# Patient Record
Sex: MALE | Race: WHITE | NOT HISPANIC OR LATINO | RURAL
[De-identification: names, ages, dates, MRNs, and addresses within clinical notes are randomized per-mention and may not be internally consistent; named-entity substitution may affect disease eponyms.]

---

## 2023-01-31 ENCOUNTER — LAB OUTSIDE AN ENCOUNTER (OUTPATIENT)
Dept: RURAL CLINIC 2 | Facility: CLINIC | Age: 73
End: 2023-01-31

## 2023-01-31 ENCOUNTER — OFFICE VISIT (OUTPATIENT)
Dept: RURAL CLINIC 2 | Facility: CLINIC | Age: 73
End: 2023-01-31
Payer: COMMERCIAL

## 2023-01-31 VITALS
BODY MASS INDEX: 31.22 KG/M2 | DIASTOLIC BLOOD PRESSURE: 108 MMHG | WEIGHT: 223 LBS | HEIGHT: 71 IN | TEMPERATURE: 97.5 F | HEART RATE: 90 BPM | SYSTOLIC BLOOD PRESSURE: 174 MMHG

## 2023-01-31 DIAGNOSIS — R19.5 POSITIVE COLORECTAL CANCER SCREENING USING COLOGUARD TEST: ICD-10-CM

## 2023-01-31 DIAGNOSIS — R12 HEARTBURN: ICD-10-CM

## 2023-01-31 DIAGNOSIS — R05.3 CHRONIC COUGH: ICD-10-CM

## 2023-01-31 PROBLEM — 68154008: Status: ACTIVE | Noted: 2023-01-31

## 2023-01-31 PROCEDURE — 99203 OFFICE O/P NEW LOW 30 MIN: CPT | Performed by: INTERNAL MEDICINE

## 2023-01-31 RX ORDER — SODIUM, POTASSIUM,MAG SULFATES 17.5-3.13G
177ML SOLUTION, RECONSTITUTED, ORAL ORAL
Qty: 1 | Refills: 0 | OUTPATIENT
Start: 2023-01-31 | End: 2023-02-02

## 2023-02-01 PROBLEM — 708699002: Status: ACTIVE | Noted: 2023-01-31

## 2023-02-01 PROBLEM — 16331000: Status: ACTIVE | Noted: 2023-01-31

## 2023-02-08 ENCOUNTER — TELEPHONE ENCOUNTER (OUTPATIENT)
Dept: URBAN - METROPOLITAN AREA CLINIC 92 | Facility: CLINIC | Age: 73
End: 2023-02-08

## 2023-02-21 ENCOUNTER — TELEPHONE ENCOUNTER (OUTPATIENT)
Dept: RURAL CLINIC 2 | Facility: CLINIC | Age: 73
End: 2023-02-21

## 2023-02-24 ENCOUNTER — CLAIMS CREATED FROM THE CLAIM WINDOW (OUTPATIENT)
Dept: RURAL MEDICAL CENTER 4 | Facility: MEDICAL CENTER | Age: 73
End: 2023-02-24

## 2023-02-24 ENCOUNTER — CLAIMS CREATED FROM THE CLAIM WINDOW (OUTPATIENT)
Dept: RURAL MEDICAL CENTER 4 | Facility: MEDICAL CENTER | Age: 73
End: 2023-02-24
Payer: COMMERCIAL

## 2023-02-24 ENCOUNTER — OFFICE VISIT (OUTPATIENT)
Dept: RURAL MEDICAL CENTER 4 | Facility: MEDICAL CENTER | Age: 73
End: 2023-02-24

## 2023-02-24 DIAGNOSIS — I85.00 ESOPHAGEAL  VARICOSE VEINS: ICD-10-CM

## 2023-02-24 DIAGNOSIS — R19.5 ABNORMAL CONSISTENCY OF STOOL: ICD-10-CM

## 2023-02-24 DIAGNOSIS — D12.8 ADENOMATOUS POLYP OF RECTUM: ICD-10-CM

## 2023-02-24 DIAGNOSIS — D12.2 ADENOMA OF ASCENDING COLON: ICD-10-CM

## 2023-02-24 DIAGNOSIS — K22.89 DILATATION OF ESOPHAGUS: ICD-10-CM

## 2023-02-24 DIAGNOSIS — K22.10 BARRETT'S ULCER OF ESOPHAGUS: ICD-10-CM

## 2023-02-24 DIAGNOSIS — Z12.11 COLON CANCER SCREENING: ICD-10-CM

## 2023-02-24 DIAGNOSIS — K29.60 ADENOPAPILLOMATOSIS GASTRICA: ICD-10-CM

## 2023-02-24 PROBLEM — 19943007: Status: ACTIVE | Noted: 2023-02-24

## 2023-02-24 PROCEDURE — 74170 CT ABD WO CNTRST FLWD CNTRST: CPT | Performed by: INTERNAL MEDICINE

## 2023-02-24 PROCEDURE — M1008 <50% TOTAL PT OUTPT RA ENCTS: HCPCS | Performed by: INTERNAL MEDICINE

## 2023-02-24 PROCEDURE — 45385 COLONOSCOPY W/LESION REMOVAL: CPT | Performed by: INTERNAL MEDICINE

## 2023-02-24 PROCEDURE — 43239 EGD BIOPSY SINGLE/MULTIPLE: CPT | Performed by: INTERNAL MEDICINE

## 2023-02-24 RX ORDER — PANTOPRAZOLE SODIUM 40 MG/1
1 TABLET TABLET, DELAYED RELEASE ORAL ONCE A DAY
Qty: 90 TABLET | Refills: 3 | OUTPATIENT
Start: 2023-02-24

## 2023-02-24 RX ORDER — NADOLOL 20 MG/1
1 TABLET TABLET ORAL ONCE A DAY
Qty: 90 | Refills: 3 | OUTPATIENT
Start: 2023-02-24

## 2023-03-01 ENCOUNTER — TELEPHONE ENCOUNTER (OUTPATIENT)
Dept: URBAN - METROPOLITAN AREA CLINIC 105 | Facility: CLINIC | Age: 73
End: 2023-03-01

## 2023-03-01 RX ORDER — CLOTRIMAZOLE 10 MG/1
1 LOZENGE ORAL; TOPICAL THREE TIMES A DAY
Qty: 30 | Refills: 0 | OUTPATIENT

## 2023-03-07 ENCOUNTER — TELEPHONE ENCOUNTER (OUTPATIENT)
Dept: URBAN - METROPOLITAN AREA CLINIC 105 | Facility: CLINIC | Age: 73
End: 2023-03-07

## 2023-03-07 PROBLEM — 419728003: Status: ACTIVE | Noted: 2023-03-07

## 2023-05-02 ENCOUNTER — DASHBOARD ENCOUNTERS (OUTPATIENT)
Age: 73
End: 2023-05-02

## 2023-06-07 ENCOUNTER — OFFICE VISIT (OUTPATIENT)
Dept: RURAL CLINIC 8 | Facility: CLINIC | Age: 73
End: 2023-06-07

## 2023-06-07 RX ORDER — NADOLOL 20 MG/1
1 TABLET TABLET ORAL ONCE A DAY
Qty: 90 | Refills: 3 | COMMUNITY
Start: 2023-02-24

## 2023-06-07 RX ORDER — CLOTRIMAZOLE 10 MG/1
1 LOZENGE ORAL; TOPICAL THREE TIMES A DAY
Qty: 30 | Refills: 0 | COMMUNITY

## 2023-06-07 RX ORDER — PANTOPRAZOLE SODIUM 40 MG/1
1 TABLET TABLET, DELAYED RELEASE ORAL ONCE A DAY
Qty: 90 TABLET | Refills: 3 | COMMUNITY
Start: 2023-02-24

## 2023-07-21 ENCOUNTER — TELEPHONE ENCOUNTER (OUTPATIENT)
Dept: RURAL CLINIC 2 | Facility: CLINIC | Age: 73
End: 2023-07-21

## 2023-07-21 RX ORDER — PANTOPRAZOLE SODIUM 40 MG/1
1 TABLET TABLET, DELAYED RELEASE ORAL ONCE A DAY
Qty: 90 TABLET | Refills: 3
Start: 2023-02-24

## 2023-07-21 RX ORDER — NADOLOL 20 MG/1
1 TABLET TABLET ORAL ONCE A DAY
Qty: 90 | Refills: 3
Start: 2023-02-24

## 2023-07-26 NOTE — HPI-TODAY'S VISIT:
The patient is a 72-year-old gentleman who presents on referral from Lily Juarez, nurse practitioner for a positive Cologuard stool test.  A copy of this document to be sent to the referring provider.  The patient denies undergoing a colonoscopy in the past.  He denies a family history of colon cancer.  He is currently asymptomatic and denies abdominal pain, change in bowel pattern, melena and hematochezia.  He reports a history of heartburn and is currently taking Tums and is providing relief.  He also reports intermittent chronic cough after eating.  He denies dysphagia or abdominal pain.  Past medical history is noncontributory.  Your current Orthopaedic problem we are working together to treat is:  right shoulder surgery follow up.        It is recommended you schedule a follow-up appointment with Thierno Cordova MD in 6 months.    Office hours are 8:00 am to 5:00 pm Monday through Friday.  If it is urgent that you speak with someone outside of these hours, our [facility] will be able to assist you.  You can reach the office by calling the [phone] ([place]).    We do highly recommend [facility], if you do not already have this.  You can request access via the internet or by simply talking with a  at any of the clinics.  [email or web address].    Thank you for choosing [facility] as your Orthopaedic provider!

## 2024-12-03 ENCOUNTER — OFFICE VISIT (OUTPATIENT)
Dept: RURAL CLINIC 2 | Facility: CLINIC | Age: 74
End: 2024-12-03

## 2024-12-03 PROBLEM — 419728003: Status: ACTIVE | Noted: 2024-12-03

## 2024-12-03 PROBLEM — 303082009: Status: ACTIVE | Noted: 2024-12-03

## 2024-12-03 PROBLEM — 14223005: Status: ACTIVE | Noted: 2024-12-03

## 2024-12-03 RX ORDER — NADOLOL 20 MG/1
1 TABLET TABLET ORAL ONCE A DAY
Qty: 90 | Refills: 3 | Status: ACTIVE | COMMUNITY
Start: 2023-02-24

## 2024-12-03 RX ORDER — PANTOPRAZOLE SODIUM 40 MG/1
1 TABLET TABLET, DELAYED RELEASE ORAL ONCE A DAY
Qty: 90 TABLET | Refills: 3 | Status: ACTIVE | COMMUNITY
Start: 2023-02-24

## 2024-12-03 RX ORDER — CLOTRIMAZOLE 10 MG/1
1 LOZENGE ORAL; TOPICAL THREE TIMES A DAY
Qty: 30 | Refills: 0 | Status: ACTIVE | COMMUNITY

## 2024-12-03 NOTE — PHYSICAL EXAM GASTROINTESTINAL
Abdomen , soft, nontender, nondistended , no guarding or rigidity , no masses palpable , normal bowel sounds , Liver and Spleen,  no hepatosplenomegaly , liver nontender Left message informing patient that original appt on 9/21 at 315 will be reschedule to 9/24 at 400 pm. If this does not work for the patient please reschedule.

## 2024-12-03 NOTE — HPI-TODAY'S VISIT:
The patient is a 72-year-old gentleman who presents on referral from Lily Juarez, nurse practitioner for a positive Cologuard stool test.  A copy of this document to be sent to the referring provider.  The patient denies undergoing a colonoscopy in the past.  He denies a family history of colon cancer.  He is currently asymptomatic and denies abdominal pain, change in bowel pattern, melena and hematochezia.  He reports a history of heartburn and is currently taking Tums and is providing relief.  He also reports intermittent chronic cough after eating.  He denies dysphagia or abdominal pain.  Past medical history is noncontributory.

## 2024-12-24 ENCOUNTER — LAB OUTSIDE AN ENCOUNTER (OUTPATIENT)
Dept: RURAL CLINIC 2 | Facility: CLINIC | Age: 74
End: 2024-12-24

## 2024-12-24 ENCOUNTER — OFFICE VISIT (OUTPATIENT)
Dept: RURAL CLINIC 2 | Facility: CLINIC | Age: 74
End: 2024-12-24

## 2024-12-24 VITALS
SYSTOLIC BLOOD PRESSURE: 117 MMHG | HEART RATE: 62 BPM | DIASTOLIC BLOOD PRESSURE: 69 MMHG | TEMPERATURE: 97.1 F | HEIGHT: 71 IN | BODY MASS INDEX: 29.57 KG/M2 | WEIGHT: 211.2 LBS

## 2024-12-24 PROBLEM — 816181009: Status: ACTIVE | Noted: 2024-12-24

## 2024-12-24 PROBLEM — 420054005: Status: ACTIVE | Noted: 2024-12-24

## 2024-12-24 RX ORDER — FUROSEMIDE 40 MG/1
1 TABLET TABLET ORAL AS NEEDED
Status: ACTIVE | COMMUNITY

## 2024-12-24 RX ORDER — NADOLOL 20 MG/1
2 TABLETS TABLET ORAL ONCE A DAY
Status: ACTIVE | COMMUNITY

## 2024-12-24 RX ORDER — B-COMPLEX WITH VITAMIN C
AS DIRECTED TABLET ORAL
Status: ACTIVE | COMMUNITY

## 2024-12-24 RX ORDER — OMEPRAZOLE 20 MG/1
1 CAPSULE 1/2 TO 1 HOUR BEFORE MORNING MEAL CAPSULE, DELAYED RELEASE ORAL ONCE A DAY
Status: ACTIVE | COMMUNITY

## 2024-12-24 RX ORDER — ALPRAZOLAM 0.5 MG/1
1 TABLET TABLET ORAL TWICE A DAY
Status: ACTIVE | COMMUNITY

## 2024-12-24 RX ORDER — IRON POLYSACCHARIDE COMPLEX 150 MG
1 CAPSULE CAPSULE ORAL
Status: ACTIVE | COMMUNITY

## 2024-12-24 RX ORDER — POTASSIUM CHLORIDE 10 MEQ/1
1 TABLET WITH FOOD TABLET, FILM COATED, EXTENDED RELEASE ORAL TWICE A DAY
Status: ACTIVE | COMMUNITY

## 2024-12-24 RX ORDER — SPIRONOLACTONE 25 MG/1
1 TABLET TABLET, FILM COATED ORAL
Status: ACTIVE | COMMUNITY

## 2024-12-24 NOTE — HPI-TODAY'S VISIT:
The patient is a 72-year-old gentleman who presents on referral from Lily Juarez, nurse practitioner for a positive Cologuard stool test.  A copy of this document to be sent to the referring provider.  The patient denies undergoing a colonoscopy in the past.  He denies a family history of colon cancer.  He is currently asymptomatic and denies abdominal pain, change in bowel pattern, melena and hematochezia.  He reports a history of heartburn and is currently taking Tums and is providing relief.  He also reports intermittent chronic cough after eating.  He denies dysphagia or abdominal pain.  Past medical history is noncontributory. 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 S/p paracentesis with 5 liters removed. He was started on Spironolactone 25 mg daily.   He missed his scheduled appointments with Dr. Farrell stating he was not ready to accept the diagnosis and hasn't been seen in 18 months.   CLD w/u completed and negative. He has been sober since August. He currently has mild bilateral LE edema. He denies an increase in his abdominal girth, no jaundice, brain fog or fatigue. EGD completed 4/23 with findings of grade 3 esophageal varices with no high risk stigmata, mid esophageal ulcer and severe portal hypertensive gastropathy.  He continues on Nadolol. Colonoscopy was done at the same time with findings of 8 small adenomatous polyps.

## 2024-12-30 ENCOUNTER — TELEPHONE ENCOUNTER (OUTPATIENT)
Dept: RURAL CLINIC 8 | Facility: CLINIC | Age: 74
End: 2024-12-30

## 2024-12-30 RX ORDER — FUROSEMIDE 20 MG/1
1 TABLET TABLET ORAL ONCE A DAY
Qty: 30 | Refills: 5 | OUTPATIENT
Start: 2024-12-31 | End: 2025-06-29

## 2024-12-30 RX ORDER — SPIRONOLACTONE 50 MG/1
1 TABLET TABLET, FILM COATED ORAL ONCE A DAY
Qty: 30 | Refills: 5 | OUTPATIENT
Start: 2024-12-31 | End: 2025-06-29

## 2025-02-26 ENCOUNTER — OFFICE VISIT (OUTPATIENT)
Dept: RURAL MEDICAL CENTER 4 | Facility: MEDICAL CENTER | Age: 75
End: 2025-02-26

## 2025-02-26 DIAGNOSIS — I85.00 ESOPHAGEAL VARICES DETERMINED BY ENDOSCOPY: ICD-10-CM

## 2025-02-26 PROBLEM — 28670008: Status: ACTIVE | Noted: 2025-02-26

## 2025-02-26 PROBLEM — 428283002: Status: ACTIVE | Noted: 2025-02-26

## 2025-02-26 PROBLEM — 191813001: Status: ACTIVE | Noted: 2025-02-26

## 2025-02-26 PROBLEM — 119291004: Status: ACTIVE | Noted: 2025-02-26

## 2025-02-26 RX ORDER — POTASSIUM CHLORIDE 10 MEQ/1
1 TABLET WITH FOOD TABLET, FILM COATED, EXTENDED RELEASE ORAL TWICE A DAY
Status: ACTIVE | COMMUNITY

## 2025-02-26 RX ORDER — B-COMPLEX WITH VITAMIN C
AS DIRECTED TABLET ORAL
Status: ACTIVE | COMMUNITY

## 2025-02-26 RX ORDER — IRON POLYSACCHARIDE COMPLEX 150 MG
1 CAPSULE CAPSULE ORAL
Status: ACTIVE | COMMUNITY

## 2025-02-26 RX ORDER — OMEPRAZOLE 20 MG/1
1 CAPSULE 1/2 TO 1 HOUR BEFORE MORNING MEAL CAPSULE, DELAYED RELEASE ORAL ONCE A DAY
Status: ACTIVE | COMMUNITY

## 2025-02-26 RX ORDER — SPIRONOLACTONE 50 MG/1
1 TABLET TABLET, FILM COATED ORAL ONCE A DAY
Qty: 30 | Refills: 5 | Status: ACTIVE | COMMUNITY
Start: 2024-12-31 | End: 2025-06-29

## 2025-02-26 RX ORDER — SPIRONOLACTONE 25 MG/1
1 TABLET TABLET, FILM COATED ORAL
Status: ACTIVE | COMMUNITY

## 2025-02-26 RX ORDER — NADOLOL 20 MG/1
2 TABLETS TABLET ORAL ONCE A DAY
Status: ACTIVE | COMMUNITY

## 2025-02-26 RX ORDER — FUROSEMIDE 20 MG/1
1 TABLET TABLET ORAL ONCE A DAY
Qty: 30 | Refills: 5 | Status: ACTIVE | COMMUNITY
Start: 2024-12-31 | End: 2025-06-29

## 2025-02-26 RX ORDER — FUROSEMIDE 40 MG/1
1 TABLET TABLET ORAL AS NEEDED
Status: ACTIVE | COMMUNITY

## 2025-02-26 RX ORDER — ALPRAZOLAM 0.5 MG/1
1 TABLET TABLET ORAL TWICE A DAY
Status: ACTIVE | COMMUNITY

## 2025-03-11 PROBLEM — 71457002: Status: ACTIVE | Noted: 2025-03-11

## 2025-03-11 PROBLEM — 419728003: Status: ACTIVE | Noted: 2025-03-11

## 2025-03-11 PROBLEM — 302215000: Status: ACTIVE | Noted: 2025-03-11

## 2025-03-11 PROBLEM — 87522002: Status: ACTIVE | Noted: 2025-03-11

## 2025-03-11 PROBLEM — 399291005: Status: ACTIVE | Noted: 2025-03-11

## 2025-03-11 PROBLEM — 234349007: Status: ACTIVE | Noted: 2025-03-11

## 2025-03-12 ENCOUNTER — OFFICE VISIT (OUTPATIENT)
Dept: RURAL CLINIC 8 | Facility: CLINIC | Age: 75
End: 2025-03-12
Payer: COMMERCIAL

## 2025-03-12 VITALS
DIASTOLIC BLOOD PRESSURE: 74 MMHG | BODY MASS INDEX: 28 KG/M2 | WEIGHT: 200 LBS | SYSTOLIC BLOOD PRESSURE: 130 MMHG | HEIGHT: 71 IN | TEMPERATURE: 97.1 F | HEART RATE: 60 BPM

## 2025-03-12 DIAGNOSIS — Z86.0100 HISTORY OF COLON POLYPS: ICD-10-CM

## 2025-03-12 DIAGNOSIS — I85.00 ESOPHAGEAL VARICES DETERMINED BY ENDOSCOPY: ICD-10-CM

## 2025-03-12 DIAGNOSIS — Z87.898 HISTORY OF ASCITES: ICD-10-CM

## 2025-03-12 DIAGNOSIS — K76.6 PORTAL HYPERTENSION: ICD-10-CM

## 2025-03-12 DIAGNOSIS — R13.12 OROPHARYNGEAL DYSPHAGIA: ICD-10-CM

## 2025-03-12 DIAGNOSIS — K31.89 OTHER DISEASES OF STOMACH AND DUODENUM: ICD-10-CM

## 2025-03-12 DIAGNOSIS — D69.6 THROMBOCYTOPENIA: ICD-10-CM

## 2025-03-12 DIAGNOSIS — K22.5 ZENKER'S DIVERTICULUM: ICD-10-CM

## 2025-03-12 DIAGNOSIS — D50.9 MICROCYTIC ANEMIA: ICD-10-CM

## 2025-03-12 DIAGNOSIS — K64.9 RECTAL VARICES: ICD-10-CM

## 2025-03-12 DIAGNOSIS — K70.30 CIRRHOSIS, LAENNEC'S: ICD-10-CM

## 2025-03-12 DIAGNOSIS — N62 GYNECOMASTIA, MALE: ICD-10-CM

## 2025-03-12 PROBLEM — 4754008: Status: ACTIVE | Noted: 2025-03-12

## 2025-03-12 PROCEDURE — 99215 OFFICE O/P EST HI 40 MIN: CPT | Performed by: INTERNAL MEDICINE

## 2025-03-12 RX ORDER — POTASSIUM CHLORIDE 10 MEQ/1
1 TABLET WITH FOOD TABLET, FILM COATED, EXTENDED RELEASE ORAL TWICE A DAY
Status: ACTIVE | COMMUNITY

## 2025-03-12 RX ORDER — HYDROCHLOROTHIAZIDE 25 MG/1
1 TABLET IN THE MORNING TABLET ORAL ONCE A DAY
Status: ACTIVE | COMMUNITY

## 2025-03-12 RX ORDER — PANTOPRAZOLE SODIUM 40 MG/1
1 TABLET 1/2 TO 1 HOUR BEFORE MORNING MEAL TABLET, DELAYED RELEASE ORAL ONCE A DAY
Status: ACTIVE | COMMUNITY

## 2025-03-12 RX ORDER — NADOLOL 20 MG/1
2 TABLETS TABLET ORAL ONCE A DAY
Status: ACTIVE | COMMUNITY

## 2025-03-12 RX ORDER — QUETIAPINE 25 MG/1
1 TABLET AT BEDTIME TABLET, FILM COATED ORAL ONCE A DAY
Status: ACTIVE | COMMUNITY

## 2025-03-12 RX ORDER — EPLERENONE 50 MG/1
1 TABLET TABLET ORAL ONCE A DAY
Qty: 90 TABLET | Refills: 3 | OUTPATIENT
Start: 2025-03-12 | End: 2026-03-06

## 2025-03-12 RX ORDER — SPIRONOLACTONE 50 MG/1
1 TABLET TABLET, FILM COATED ORAL ONCE A DAY
Qty: 30 | Refills: 5 | Status: DISCONTINUED | COMMUNITY
Start: 2024-12-31 | End: 2025-06-29

## 2025-03-12 RX ORDER — IRON POLYSACCHARIDE COMPLEX 150 MG
1 CAPSULE CAPSULE ORAL
Status: ACTIVE | COMMUNITY

## 2025-03-12 RX ORDER — B-COMPLEX WITH VITAMIN C
AS DIRECTED TABLET ORAL
Status: ACTIVE | COMMUNITY

## 2025-03-12 RX ORDER — FUROSEMIDE 20 MG/1
1 TABLET TABLET ORAL ONCE A DAY
Qty: 30 | Refills: 5 | Status: ACTIVE | COMMUNITY
Start: 2024-12-31 | End: 2025-06-29

## 2025-03-12 RX ORDER — OMEPRAZOLE 20 MG/1
1 CAPSULE 1/2 TO 1 HOUR BEFORE MORNING MEAL CAPSULE, DELAYED RELEASE ORAL ONCE A DAY
Status: DISCONTINUED | COMMUNITY

## 2025-03-12 RX ORDER — ALPRAZOLAM 0.5 MG/1
1 TABLET TABLET ORAL TWICE A DAY
Status: DISCONTINUED | COMMUNITY

## 2025-03-12 NOTE — HPI-TODAY'S VISIT:
The patient is a 72-year-old gentleman who presents on referral from Lily Juarez, nurse practitioner for a positive Cologuard stool test.  A copy of this document to be sent to the referring provider.  The patient denies undergoing a colonoscopy in the past.  He denies a family history of colon cancer.  He is currently asymptomatic and denies abdominal pain, change in bowel pattern, melena and hematochezia.  He reports a history of heartburn and is currently taking Tums and is providing relief.  He also reports intermittent chronic cough after eating.  He denies dysphagia or abdominal pain.  Past medical history is noncontributory. 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 S/p paracentesis with 5 liters removed. He was started on Spironolactone 25 mg daily.   He missed his scheduled appointments with Dr. Farrell stating he was not ready to accept the diagnosis and hasn't been seen in 18 months.   CLD w/u completed and negative. He has been sober since August. He currently has mild bilateral LE edema. He denies an increase in his abdominal girth, no jaundice, brain fog or fatigue. EGD completed 4/23 with findings of grade 3 esophageal varices with no high risk stigmata, mid esophageal ulcer and severe portal hypertensive gastropathy.  He continues on Nadolol. Colonoscopy was done at the same time with findings of 8 small adenomatous polyps.  - 3/12/2025: i last saw this pt in February of 2025 when i did his bidirectonal endoscopy. ther was a small zenker's diverticulum and grade 3 EV i banded x 4 and PHG. colon had two small adenomas in the cecum. he had one paracentesis in november of 2024 with 5 liters removed.  i started him on spironolacto but this was changed to HCTZ by a PA due to gynecomastia.. he says that he has been abstinent from ETOH since 8/2024

## 2025-03-13 ENCOUNTER — TELEPHONE ENCOUNTER (OUTPATIENT)
Dept: RURAL CLINIC 2 | Facility: CLINIC | Age: 75
End: 2025-03-13

## 2025-03-13 RX ORDER — EPLERENONE 50 MG/1
1 TABLET TABLET ORAL ONCE A DAY
Qty: 90 TABLET | Refills: 3
Start: 2025-03-12 | End: 2026-03-09

## 2025-03-21 ENCOUNTER — TELEPHONE ENCOUNTER (OUTPATIENT)
Dept: URBAN - METROPOLITAN AREA CLINIC 105 | Facility: CLINIC | Age: 75
End: 2025-03-21

## 2025-03-21 RX ORDER — SERTRALINE HYDROCHLORIDE 25 MG/1
1 TABLET TABLET, FILM COATED ORAL
Qty: 90 | Refills: 3 | OUTPATIENT
Start: 2025-03-21

## 2025-04-01 ENCOUNTER — OFFICE VISIT (OUTPATIENT)
Dept: RURAL MEDICAL CENTER 4 | Facility: MEDICAL CENTER | Age: 75
End: 2025-04-01
Payer: COMMERCIAL

## 2025-04-01 DIAGNOSIS — K31.89 ACHYLIA: ICD-10-CM

## 2025-04-01 DIAGNOSIS — K76.6 CLINICALLY SIGNIFICANT PORTAL HYPERTENSION: ICD-10-CM

## 2025-04-01 DIAGNOSIS — K22.5 ACQUIRED DIVERTICULUM OF ESOPHAGUS: ICD-10-CM

## 2025-04-01 DIAGNOSIS — I85.10 ESOPH VARICE OTHER DIS: ICD-10-CM

## 2025-04-01 PROCEDURE — 43244 EGD VARICES LIGATION: CPT | Performed by: INTERNAL MEDICINE

## 2025-04-01 RX ORDER — EPLERENONE 50 MG/1
1 TABLET TABLET ORAL ONCE A DAY
Qty: 90 TABLET | Refills: 3 | Status: ACTIVE | COMMUNITY
Start: 2025-03-12 | End: 2026-03-09

## 2025-04-01 RX ORDER — SERTRALINE HYDROCHLORIDE 25 MG/1
1 TABLET TABLET, FILM COATED ORAL
Qty: 90 | Refills: 3 | Status: ACTIVE | COMMUNITY
Start: 2025-03-21

## 2025-04-01 RX ORDER — B-COMPLEX WITH VITAMIN C
AS DIRECTED TABLET ORAL
Status: ACTIVE | COMMUNITY

## 2025-04-01 RX ORDER — QUETIAPINE 25 MG/1
1 TABLET AT BEDTIME TABLET, FILM COATED ORAL ONCE A DAY
Status: ACTIVE | COMMUNITY

## 2025-04-01 RX ORDER — PANTOPRAZOLE SODIUM 40 MG/1
1 TABLET 1/2 TO 1 HOUR BEFORE MORNING MEAL TABLET, DELAYED RELEASE ORAL ONCE A DAY
Status: ACTIVE | COMMUNITY

## 2025-04-01 RX ORDER — NADOLOL 20 MG/1
2 TABLETS TABLET ORAL ONCE A DAY
Status: ACTIVE | COMMUNITY

## 2025-04-01 RX ORDER — IRON POLYSACCHARIDE COMPLEX 150 MG
1 CAPSULE CAPSULE ORAL
Status: ACTIVE | COMMUNITY

## 2025-04-01 RX ORDER — FUROSEMIDE 20 MG/1
1 TABLET TABLET ORAL ONCE A DAY
Qty: 30 | Refills: 5 | Status: ACTIVE | COMMUNITY
Start: 2024-12-31 | End: 2025-06-29

## 2025-04-01 RX ORDER — HYDROCHLOROTHIAZIDE 25 MG/1
1 TABLET IN THE MORNING TABLET ORAL ONCE A DAY
Status: ACTIVE | COMMUNITY

## 2025-04-01 RX ORDER — POTASSIUM CHLORIDE 10 MEQ/1
1 TABLET WITH FOOD TABLET, FILM COATED, EXTENDED RELEASE ORAL TWICE A DAY
Status: ACTIVE | COMMUNITY

## 2025-05-01 ENCOUNTER — OFFICE VISIT (OUTPATIENT)
Dept: RURAL MEDICAL CENTER 4 | Facility: MEDICAL CENTER | Age: 75
End: 2025-05-01
Payer: MEDICARE

## 2025-05-01 DIAGNOSIS — K31.89 ACHYLIA: ICD-10-CM

## 2025-05-01 DIAGNOSIS — I85.10 ESOPH VARICE OTHER DIS: ICD-10-CM

## 2025-05-01 DIAGNOSIS — K76.6 CLINICALLY SIGNIFICANT PORTAL HYPERTENSION: ICD-10-CM

## 2025-05-01 PROCEDURE — 43244 EGD VARICES LIGATION: CPT | Performed by: INTERNAL MEDICINE

## 2025-05-01 RX ORDER — EPLERENONE 50 MG/1
1 TABLET TABLET ORAL ONCE A DAY
Qty: 90 TABLET | Refills: 3 | Status: ACTIVE | COMMUNITY
Start: 2025-03-12 | End: 2026-03-09

## 2025-05-01 RX ORDER — POTASSIUM CHLORIDE 10 MEQ/1
1 TABLET WITH FOOD TABLET, FILM COATED, EXTENDED RELEASE ORAL TWICE A DAY
Status: ACTIVE | COMMUNITY

## 2025-05-01 RX ORDER — IRON POLYSACCHARIDE COMPLEX 150 MG
1 CAPSULE CAPSULE ORAL
Status: ACTIVE | COMMUNITY

## 2025-05-01 RX ORDER — NADOLOL 20 MG/1
2 TABLETS TABLET ORAL ONCE A DAY
Status: ACTIVE | COMMUNITY

## 2025-05-01 RX ORDER — PANTOPRAZOLE SODIUM 40 MG/1
1 TABLET 1/2 TO 1 HOUR BEFORE MORNING MEAL TABLET, DELAYED RELEASE ORAL ONCE A DAY
Status: ACTIVE | COMMUNITY

## 2025-05-01 RX ORDER — FUROSEMIDE 20 MG/1
1 TABLET TABLET ORAL ONCE A DAY
Qty: 30 | Refills: 5 | Status: ACTIVE | COMMUNITY
Start: 2024-12-31 | End: 2025-06-29

## 2025-05-01 RX ORDER — QUETIAPINE 25 MG/1
1 TABLET AT BEDTIME TABLET, FILM COATED ORAL ONCE A DAY
Status: ACTIVE | COMMUNITY

## 2025-05-01 RX ORDER — B-COMPLEX WITH VITAMIN C
AS DIRECTED TABLET ORAL
Status: ACTIVE | COMMUNITY

## 2025-05-01 RX ORDER — SERTRALINE HYDROCHLORIDE 25 MG/1
1 TABLET TABLET, FILM COATED ORAL
Qty: 90 | Refills: 3 | Status: ACTIVE | COMMUNITY
Start: 2025-03-21

## 2025-05-01 RX ORDER — HYDROCHLOROTHIAZIDE 25 MG/1
1 TABLET IN THE MORNING TABLET ORAL ONCE A DAY
Status: ACTIVE | COMMUNITY

## 2025-06-23 ENCOUNTER — TELEPHONE ENCOUNTER (OUTPATIENT)
Dept: RURAL CLINIC 2 | Facility: CLINIC | Age: 75
End: 2025-06-23

## 2025-07-08 PROBLEM — 14223005: Status: ACTIVE | Noted: 2025-07-08

## 2025-07-08 PROBLEM — 1082601000119104: Status: ACTIVE | Noted: 2025-07-08

## 2025-07-09 ENCOUNTER — OFFICE VISIT (OUTPATIENT)
Dept: RURAL CLINIC 8 | Facility: CLINIC | Age: 75
End: 2025-07-09
Payer: MEDICARE

## 2025-07-09 ENCOUNTER — LAB OUTSIDE AN ENCOUNTER (OUTPATIENT)
Dept: RURAL CLINIC 8 | Facility: CLINIC | Age: 75
End: 2025-07-09

## 2025-07-09 DIAGNOSIS — I85.00 ESOPHAGEAL VARICES WITHOUT BLEEDING, UNSPECIFIED ESOPHAGEAL VARICES TYPE: ICD-10-CM

## 2025-07-09 DIAGNOSIS — K70.31 ASCITES DUE TO ALCOHOLIC CIRRHOSIS: ICD-10-CM

## 2025-07-09 DIAGNOSIS — K31.89 OTHER DISEASES OF STOMACH AND DUODENUM: ICD-10-CM

## 2025-07-09 DIAGNOSIS — K76.6 PORTAL HYPERTENSION: ICD-10-CM

## 2025-07-09 DIAGNOSIS — K70.30 LAENNEC'S CIRRHOSIS: ICD-10-CM

## 2025-07-09 PROCEDURE — 99215 OFFICE O/P EST HI 40 MIN: CPT | Performed by: INTERNAL MEDICINE

## 2025-07-09 RX ORDER — POTASSIUM CHLORIDE 10 MEQ/1
1 TABLET WITH FOOD TABLET, FILM COATED, EXTENDED RELEASE ORAL TWICE A DAY
Status: DISCONTINUED | COMMUNITY

## 2025-07-09 RX ORDER — PANTOPRAZOLE SODIUM 40 MG/1
1 TABLET 1/2 TO 1 HOUR BEFORE MORNING MEAL TABLET, DELAYED RELEASE ORAL ONCE A DAY
Status: ACTIVE | COMMUNITY

## 2025-07-09 RX ORDER — SPIRONOLACTONE 50 MG/1
1 TABLET TABLET, FILM COATED ORAL ONCE A DAY
Qty: 90 TABLET | Refills: 1

## 2025-07-09 RX ORDER — SERTRALINE HYDROCHLORIDE 25 MG/1
1 TABLET TABLET, FILM COATED ORAL
Qty: 90 | Refills: 3 | Status: ACTIVE | COMMUNITY
Start: 2025-03-21

## 2025-07-09 RX ORDER — B-COMPLEX WITH VITAMIN C
AS DIRECTED TABLET ORAL
Status: ACTIVE | COMMUNITY

## 2025-07-09 RX ORDER — HYDROCHLOROTHIAZIDE 25 MG/1
1 TABLET IN THE MORNING TABLET ORAL ONCE A DAY
Status: DISCONTINUED | COMMUNITY

## 2025-07-09 RX ORDER — QUETIAPINE 25 MG/1
1 TABLET AT BEDTIME TABLET, FILM COATED ORAL ONCE A DAY
Status: ACTIVE | COMMUNITY

## 2025-07-09 RX ORDER — NADOLOL 20 MG/1
2 TABLETS TABLET ORAL ONCE A DAY
Status: ACTIVE | COMMUNITY

## 2025-07-09 RX ORDER — EPLERENONE 50 MG/1
1 TABLET TABLET ORAL ONCE A DAY
Qty: 90 TABLET | Refills: 3 | Status: DISCONTINUED | COMMUNITY
Start: 2025-03-12 | End: 2026-03-09

## 2025-07-09 RX ORDER — SPIRONOLACTONE 25 MG/1
1 TABLET TABLET, FILM COATED ORAL ONCE A DAY
Status: ACTIVE | COMMUNITY

## 2025-07-09 RX ORDER — IRON POLYSACCHARIDE COMPLEX 150 MG
1 CAPSULE CAPSULE ORAL
Status: DISCONTINUED | COMMUNITY

## 2025-07-09 NOTE — HPI-TODAY'S VISIT:
The patient is a 72-year-old gentleman who presents on referral from Lily Juarez, nurse practitioner for a positive Cologuard stool test.  A copy of this document to be sent to the referring provider.  The patient denies undergoing a colonoscopy in the past.  He denies a family history of colon cancer.  He is currently asymptomatic and denies abdominal pain, change in bowel pattern, melena and hematochezia.  He reports a history of heartburn and is currently taking Tums and is providing relief.  He also reports intermittent chronic cough after eating.  He denies dysphagia or abdominal pain.  Past medical history is noncontributory. 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 S/p paracentesis with 5 liters removed. He was started on Spironolactone 25 mg daily.   He missed his scheduled appointments with Dr. Farrell stating he was not ready to accept the diagnosis and hasn't been seen in 18 months.   CLD w/u completed and negative. He has been sober since August. He currently has mild bilateral LE edema. He denies an increase in his abdominal girth, no jaundice, brain fog or fatigue. EGD completed 4/23 with findings of grade 3 esophageal varices with no high risk stigmata, mid esophageal ulcer and severe portal hypertensive gastropathy.  He continues on Nadolol. Colonoscopy was done at the same time with findings of 8 small adenomatous polyps.  - 3/12/2025: i last saw this pt in February of 2025 when i did his bidirectonal endoscopy. ther was a small zenker's diverticulum and grade 3 EV i banded x 4 and PHG. colon had two small adenomas in the cecum. he had one paracentesis in november of 2024 with 5 liters removed.  i started him on spironolacto but this was changed to HCTZ by a PA due to gynecomastia.. he says that he has been abstinent from ETOH since 8/2024 - 7/9/2025: pt comes to follow up Laennec's cirrhosis.  i last did his egd with banding of varices in May of 2025. a one-year follow up interval was suggested. he continues on nadolol.  he requested that spironolactone be changed due to gynecomastia so I prescribed eplerenone which was too costly and he never got it. he was complaining of pruritis and i started him on low dose zoloft for that. he is not having any more itching. he is on spironolactone 25mg daily and lasix 20mg daily.  -

## 2025-07-28 ENCOUNTER — TELEPHONE ENCOUNTER (OUTPATIENT)
Dept: RURAL CLINIC 2 | Facility: CLINIC | Age: 75
End: 2025-07-28

## 2025-08-15 ENCOUNTER — TELEPHONE ENCOUNTER (OUTPATIENT)
Dept: RURAL CLINIC 8 | Facility: CLINIC | Age: 75
End: 2025-08-15

## 2025-08-15 RX ORDER — FUROSEMIDE 20 MG/1
1 TABLET TABLET ORAL ONCE A DAY
Qty: 30 | Refills: 4 | OUTPATIENT
Start: 2025-08-18 | End: 2026-01-15